# Patient Record
Sex: FEMALE | Race: WHITE | NOT HISPANIC OR LATINO | Employment: FULL TIME | URBAN - METROPOLITAN AREA
[De-identification: names, ages, dates, MRNs, and addresses within clinical notes are randomized per-mention and may not be internally consistent; named-entity substitution may affect disease eponyms.]

---

## 2017-02-21 ENCOUNTER — TELEPHONE (OUTPATIENT)
Dept: OBSTETRICS AND GYNECOLOGY | Facility: CLINIC | Age: 37
End: 2017-02-21

## 2017-02-21 NOTE — TELEPHONE ENCOUNTER
----- Message from Reji Newell sent at 2/21/2017  3:03 PM CST -----  Contact: Pt  X_ 1st Request  _ 2nd Request  _ 3rd Request    Who:BRANDON MARTINEZ [[id number]]    Why: Patient states she needs to speak with staff in regards to the Dr. Sending a request to ask for the patient medical records.    What Number to Call Back: [phone]    When to Expect a call back: (Before the end of the day)  -- if call after 3:00 call back will be tomorrow.

## 2017-02-22 NOTE — TELEPHONE ENCOUNTER
"Pt states was dx with Lichen Planus by derm/bx but has not has gyn bx. Has vulva itching and burning and no one can find out "why" Discussed vulva clinic schedule/location and appt scheudled  "

## 2017-05-02 ENCOUNTER — OFFICE VISIT (OUTPATIENT)
Dept: OBSTETRICS AND GYNECOLOGY | Facility: CLINIC | Age: 37
End: 2017-05-02
Attending: OBSTETRICS & GYNECOLOGY
Payer: COMMERCIAL

## 2017-05-02 VITALS
DIASTOLIC BLOOD PRESSURE: 70 MMHG | SYSTOLIC BLOOD PRESSURE: 112 MMHG | HEIGHT: 66 IN | WEIGHT: 232.56 LBS | BODY MASS INDEX: 37.38 KG/M2

## 2017-05-02 DIAGNOSIS — L43.9 LICHEN PLANUS: Primary | ICD-10-CM

## 2017-05-02 DIAGNOSIS — N76.1 CHRONIC VAGINITIS: ICD-10-CM

## 2017-05-02 DIAGNOSIS — B37.31 CANDIDIASIS OF VULVA AND VAGINA: ICD-10-CM

## 2017-05-02 PROBLEM — N76.0 VAGINITIS: Status: ACTIVE | Noted: 2017-05-02

## 2017-05-02 PROCEDURE — 1160F RVW MEDS BY RX/DR IN RCRD: CPT | Mod: S$GLB,,, | Performed by: OBSTETRICS & GYNECOLOGY

## 2017-05-02 PROCEDURE — 99204 OFFICE O/P NEW MOD 45 MIN: CPT | Mod: S$GLB,,, | Performed by: OBSTETRICS & GYNECOLOGY

## 2017-05-02 PROCEDURE — 99999 PR PBB SHADOW E&M-EST. PATIENT-LVL II: CPT | Mod: PBBFAC,,, | Performed by: OBSTETRICS & GYNECOLOGY

## 2017-05-02 PROCEDURE — 87210 SMEAR WET MOUNT SALINE/INK: CPT | Mod: QW,S$GLB,, | Performed by: OBSTETRICS & GYNECOLOGY

## 2017-05-02 PROCEDURE — 87102 FUNGUS ISOLATION CULTURE: CPT

## 2017-05-02 RX ORDER — CLOBETASOL PROPIONATE 0.5 MG/G
OINTMENT TOPICAL
COMMUNITY
Start: 2015-05-23

## 2017-05-02 RX ORDER — AMITRIPTYLINE HYDROCHLORIDE 25 MG/1
25 TABLET, FILM COATED ORAL
COMMUNITY
Start: 2015-07-01 | End: 2017-08-07

## 2017-05-02 RX ORDER — SERTRALINE HYDROCHLORIDE 50 MG/1
50 TABLET, FILM COATED ORAL
COMMUNITY
Start: 2015-07-01

## 2017-05-02 NOTE — MR AVS SNAPSHOT
"    [facility] [address]  [address] [facility] [facility] [address] [address]  [facility] [address]  Phone: [phone]  Fax: [phone]                  Sheri Bey   2017 2:30 PM   Office Visit    Description:  Female : 1980   Provider:  Edis Bearden III, MD   Department:  [facility] [address]           Reason for Visit     Vulvar Itch                To Do List           Goals (5 Years of Data)     None      [facility]     [facility] Nurse Care Line -  Assistance  Unless otherwise directed by your provider, please contact [facility], our nurse care line that is available for  assistance.     Registered nurses in the [facility] provide: appointment scheduling, clinical advisement, health education, and other advisory services.  Call: [phone] (toll free)               Medications                Verify that the below list of medications is an accurate representation of the medications you are currently taking.  If none reported, the list may be blank. If incorrect, please contact your healthcare provider. Carry this list with you in case of emergency.           Current Medications     amitriptyline (ELAVIL) 25 MG tablet 25 mg.    clobetasol 0.05% (TEMOVATE) 0.05 % Oint 2 (two) times daily.    sertraline (ZOLOFT) 50 MG tablet Take 50 mg by mouth.           Clinical Reference Information           Your Vitals Were     BP Height Weight Last Period BMI    112/70 5' 6" (1.676 m) 105.5 kg (232 lb 9.4 oz) 2017 37.54 kg/m2      Blood Pressure          Most Recent Value    BP  112/70      Allergies as of 2017     No Known Allergies      Immunizations Administered on Date of Encounter - 2017     None      [facility] Sign-Up     Activating your [facility] account is as easy as 1-2-3!     1) Visit [email or web address], select Sign Up Now, enter this activation code and your date of birth, then select Next.  [email or web address]  Expires: 2017  3:01 PM      2) Create a " username and password to use when you visit [facility] in the future and select a security question in case you lose your password and select Next.    3) Enter your e-mail address and click Sign Up!    Additional Information  If you have questions, please e-mail [email or web address] or call [phone] to talk to our [facility] staff. Remember, [employer] [facility] is NOT to be used for urgent needs. For medical emergencies, dial 911.         Language Assistance Services     ATTENTION: Language assistance services are available, free of charge. Please call [phone].      ATENCIÓN: Si habla español, tiene a wright disposición servicios gratuitos de asistencia lingüística. Llame al [phone].     CHÚ Ý: N?u b?n nói Ti?ng Vi?t, có các d?ch v? h? tr? ngôn ng? mi?n phí dành cho b?n. G?i s? [phone].         [facility] [address] complies with applicable Federal civil rights laws and does not discriminate on the basis of race, color, national origin, age, disability, or sex.

## 2017-05-02 NOTE — PROGRESS NOTES
Subjective:     Patient ID: Sheri Bey is a 36 y.o. female.     Chief Complaint: Vulvar Itch (new patient, self refer)     History of Present Illness: This patient is a 36 y.o.female, who presents to the GYN Vulva clinic for evaluation of vulvar irritation and discomfort associated with lichen planus.      Patient's last menstrual period was 04/11/2017.    Review of Systems    GENERAL: No fever, chills, fatigability or weightchange  SKIN: No rashes, itching or changes in color or texture of skin.  HEAD: No headaches or recent head trauma.  EYES: Visual acuity fine. No photophobia,r diplopia.  EARS: Denies earache or vertigo  NOSE: No loss of smell, no epistaxis or postnasal drip.  MOUTH & THROAT: No hoarseness or change in voice.   NODES: Denies swollen glands.  CHEST: Denies STACY, cyanosis, wheezing, cough and sputum production.  CARDIOVASCULAR: Denies chest pain, PND, orthopnea or reduced exercise tolerance.  ABDOMEN: Appetite fine. No weight loss. bloating, Denies diarrhea, abdominal pain, hematemesis or blood in stool.  URINARY: No flank pain, dysuria or hematuria.  PERIPHERAL VASCULAR: No claudication or cyanosis.Varicosities  MUSCULOSKELETAL: No joint stiffness or swelling. Denies back pain.muscle aches  NEUROLOGIC: No history of seizures, paralysis, alteration of gait or coordination.       Objective:       Physical Exam     APPEARANCE: Well nourished, well developed, in no acute distress.    GENITOURINARY:  Vulva: Abnormal female genital architecture with fusion of the frenulum and prepuce with partial burying of the clitoris under the scarred clitoral redd, erosive changes of the vestibule and vagina noted.  Urethral Meatus: Normal size and location, no lesions, no prolapse.  Urethra: No masses, tenderness, prolapse or scarring.  Vagina: Moist with decreased rugae, thick yellowish discharge, no significant cystocele or rectocele.  Cervix: No lesions, normal diameter, no stenosis, no cervical motion  tenderness. .  Uterus: 6 week size, regular shape, mobile, non-tender, normal position, good support.  Adnexa: No masses, tenderness or CDS nodularity.  Anus Perineum: No lesions, no relaxation, no external hemorrhoids.  Abdomen: No masses, tenderness, hernia or ascites, no hepatasplenomegaly  Skin: No rashes, lesions, ulcers, acne, hirsutism.  Peripheral/lower extremities: No edema, erythema or tenderness.  Lymphatic: No axillary, neck or groin nodes palp.  Mental Status: Alert, oriented x 3, normal affect and mood.  Oral: Josef striae noted on the buccal mucosa.          @PROCEDURE:@  Wet Prep:  pH = 6  -WBCS = to numerous to count  -Lactobacilli = decreased  -BV = Amsel negative  -Candida = Hyphae none seen  -Trichomnas = none seen  -Cells- Basal and Parabasal, Superficial: Maturation: Increase basal and parabasal cells with decreased superficial cells noted  -Impression: Squamous active inflammatory vaginitis  -Treatment: 100 mg/g hydrocortisone mixed with 2% Cleocin base  -[facility] in  4 weeks         Assessment:      1. Lichen planus               Plan:  1.  Clobetasol ointment to be applied to the vulva twice a day for 2 weeks,  once a day for  would uses a two week, then once a day Monday and Thursday.  2.  Compound a cream with 100 mg/g hydrocortisone and a 2% Cleocin base; 1 applicator to be applied daily for 1 week one half applicator daily for 1 week, then one fourth applicator Monday and Thursday until next visit.  3.  Candida culture of the vagina taken.  4.  Return to clinic               No orders of the defined types were placed in this encounter.

## 2017-06-02 LAB — FUNGUS SPEC CULT: NORMAL

## 2017-06-13 ENCOUNTER — TELEPHONE (OUTPATIENT)
Dept: OBSTETRICS AND GYNECOLOGY | Facility: CLINIC | Age: 37
End: 2017-06-13

## 2017-06-13 ENCOUNTER — LAB VISIT (OUTPATIENT)
Dept: LAB | Facility: OTHER | Age: 37
End: 2017-06-13
Attending: OBSTETRICS & GYNECOLOGY
Payer: COMMERCIAL

## 2017-06-13 ENCOUNTER — OFFICE VISIT (OUTPATIENT)
Dept: OBSTETRICS AND GYNECOLOGY | Facility: CLINIC | Age: 37
End: 2017-06-13
Attending: OBSTETRICS & GYNECOLOGY
Payer: COMMERCIAL

## 2017-06-13 VITALS
DIASTOLIC BLOOD PRESSURE: 76 MMHG | BODY MASS INDEX: 35.93 KG/M2 | SYSTOLIC BLOOD PRESSURE: 122 MMHG | HEIGHT: 66 IN | WEIGHT: 223.56 LBS

## 2017-06-13 DIAGNOSIS — N76.1 CHRONIC VAGINITIS: ICD-10-CM

## 2017-06-13 DIAGNOSIS — Z20.5 CONTACT WITH AND (SUSPECTED) EXPOSURE TO VIRAL HEPATITIS: ICD-10-CM

## 2017-06-13 DIAGNOSIS — B37.31 CANDIDIASIS OF VULVA AND VAGINA: ICD-10-CM

## 2017-06-13 DIAGNOSIS — L43.9 LICHEN PLANUS: Primary | ICD-10-CM

## 2017-06-13 LAB
ALBUMIN SERPL BCP-MCNC: 3.9 G/DL
ALP SERPL-CCNC: 73 U/L
ALT SERPL W/O P-5'-P-CCNC: 11 U/L
AST SERPL-CCNC: 14 U/L
BILIRUB DIRECT SERPL-MCNC: 0.2 MG/DL
BILIRUB SERPL-MCNC: 0.5 MG/DL
PROT SERPL-MCNC: 7.2 G/DL

## 2017-06-13 PROCEDURE — 87102 FUNGUS ISOLATION CULTURE: CPT

## 2017-06-13 PROCEDURE — 99999 PR PBB SHADOW E&M-EST. PATIENT-LVL III: CPT | Mod: PBBFAC,,, | Performed by: OBSTETRICS & GYNECOLOGY

## 2017-06-13 PROCEDURE — 80074 ACUTE HEPATITIS PANEL: CPT

## 2017-06-13 PROCEDURE — 36415 COLL VENOUS BLD VENIPUNCTURE: CPT

## 2017-06-13 PROCEDURE — 80076 HEPATIC FUNCTION PANEL: CPT

## 2017-06-13 PROCEDURE — 99214 OFFICE O/P EST MOD 30 MIN: CPT | Mod: S$GLB,,, | Performed by: OBSTETRICS & GYNECOLOGY

## 2017-06-13 NOTE — TELEPHONE ENCOUNTER
----- Message from Martha Loving sent at 6/13/2017 12:53 PM CDT -----  Contact: Self  The pt is calling in regards of getting an ointment if possible. The pt can be reached at   [phone]. Thanks KG

## 2017-06-13 NOTE — PROGRESS NOTES
Subjective:     Patient ID: Sheri Bey is a 36 y.o. female.     Chief Complaint: Vulvar Itch (follow up)     History of Present Illness: This patient is a 36 y.o. female, who presents to the GYN Vulva clinic for evaluation of erosive lichen planus.  The patient has used hydrocortisone 100 mg/g in a 2% Cleocin vaginal cream base.  This therapeutic regimen has improved her vulvovaginal discomfort.  She complains of a bloody vaginal discharge with insertion of the applicator.    Patient's last menstrual period was 06/06/2017.    Review of Systems    GENERAL: No fever, chills, fatigability or weightchange  SKIN: No rashes, itching or changes in color or texture of skin.  HEAD: No headaches or recent head trauma.  EYES: Visual acuity fine. No photophobia,r diplopia.  EARS: Denies earache or vertigo  NOSE: No loss of smell, no epistaxis or postnasal drip.  MOUTH & THROAT: No hoarseness or change in voice.   NODES: Denies swollen glands.  CHEST: Denies STACY, cyanosis, wheezing, cough and sputum production.  CARDIOVASCULAR: Denies chest pain, PND, orthopnea or reduced exercise tolerance.  ABDOMEN: Appetite fine. No weight loss. bloating, Denies diarrhea, abdominal pain, hematemesis or blood in stool.  URINARY: No flank pain, dysuria or hematuria.  PERIPHERAL VASCULAR: No claudication or cyanosis.Varicosities  MUSCULOSKELETAL: No joint stiffness or swelling. Denies back pain.muscle aches  NEUROLOGIC: No history of seizures, paralysis, alteration of gait or coordination.       Objective:       Physical Exam     APPEARANCE: Well nourished, well developed, in no acute distress.    GENITOURINARY:  Vulva: Abnormal female genital architecture with fusion of the frenulum and prepuce with partial burying of the clitoris under the scarred clitoral redd, erosive changes of the vestibule and vagina noted.  Urethral Meatus: Normal size and location, no lesions, no prolapse.  Urethra: No masses, tenderness, prolapse or scarring.  Vagina:  thin, atrophic and with decreased rugae, vaginal erosion and vestibule erosions noted.  no discharge, no significant cystocele or rectocele.  Cervix: No lesions, normal diameter, no stenosis, no cervical motion tenderness. .  Uterus: 6 week size, regular shape, mobile, non-tender, normal position, good support.  Adnexa: No masses, tenderness or CDS nodularity.  Anus Perineum: No lesions, no relaxation, no external hemorrhoids.  Abdomen: No masses, tenderness, hernia or ascites, no hepatasplenomegaly  Skin: No rashes, lesions, ulcers, acne, hirsutism.  Peripheral/lower extremities: No edema, erythema or tenderness.  Lymphatic: No axillary, neck or groin nodes palp.  Mental Status: Alert, oriented x 3, normal affect and mood.          @PROCEDURE:@  Wet Prep:  pH = 6.0  -WBCS = TNTC  -Lactobacilli = absent  -BV = Amsel negative  -Candida = Hyphae none seen  -Trichomnas = none seen  -Cells- Basal and Parabasal, Superficial: Maturation: Basal and parabasal cells or numerous with occasional superficial cells seen  -Impression: Desquamatous inflammatory vaginitis   -Treatment: Local estrogen, 2% Cleocin Vaginal Cream  -[facility] in   weeks         Assessment:      1. Lichen planus    2. Chronic vaginitis               Plan:  1.  Discussed the need for better control of the lichen planus by increasing the vaginal hydrocortisone therapy.  2.  Patient advised to increase her hydrocortisone/Cleocin regimen to: 4 g of hydrocortisone/2% Cleocin per vagina nightly for 1 week, 3 g nightly for 1 week, 2 g nightly for 1 week, 1 g nightly until next visit in 4 weeks.  3.  Candida culture of the vagina taken   4.  Discussed the potential association of hepatitis C and lichen planus, hepatitis C panel ordered today along with liver bracket study.  5.  Return to clinic in 4 weeks.            No orders of the defined types were placed in this encounter.

## 2017-06-13 NOTE — TELEPHONE ENCOUNTER
Pt clarifying that dr did not reorder clobetasol ointment, states she has this already. rx not ordered at this visit

## 2017-06-14 ENCOUNTER — TELEPHONE (OUTPATIENT)
Dept: OBSTETRICS AND GYNECOLOGY | Facility: CLINIC | Age: 37
End: 2017-06-14

## 2017-06-14 LAB
HAV IGM SERPL QL IA: NEGATIVE
HBV CORE IGM SERPL QL IA: NEGATIVE
HBV SURFACE AG SERPL QL IA: NEGATIVE
HCV AB SERPL QL IA: NEGATIVE

## 2017-06-14 NOTE — TELEPHONE ENCOUNTER
----- Message from Edis Bearden III, MD sent at 6/14/2017  1:17 PM CDT -----  Notify the patient that her Hepatitis screen was negative.

## 2017-07-11 ENCOUNTER — OFFICE VISIT (OUTPATIENT)
Dept: OBSTETRICS AND GYNECOLOGY | Facility: CLINIC | Age: 37
End: 2017-07-11
Attending: OBSTETRICS & GYNECOLOGY
Payer: COMMERCIAL

## 2017-07-11 VITALS
WEIGHT: 224.19 LBS | BODY MASS INDEX: 36.03 KG/M2 | HEIGHT: 66 IN | SYSTOLIC BLOOD PRESSURE: 110 MMHG | DIASTOLIC BLOOD PRESSURE: 70 MMHG

## 2017-07-11 DIAGNOSIS — N76.1 CHRONIC VAGINITIS: ICD-10-CM

## 2017-07-11 DIAGNOSIS — L43.9 LICHEN PLANUS: Primary | ICD-10-CM

## 2017-07-11 DIAGNOSIS — B37.31 CANDIDIASIS OF VULVA AND VAGINA: ICD-10-CM

## 2017-07-11 PROCEDURE — 99214 OFFICE O/P EST MOD 30 MIN: CPT | Mod: S$GLB,,, | Performed by: OBSTETRICS & GYNECOLOGY

## 2017-07-11 PROCEDURE — 99999 PR PBB SHADOW E&M-EST. PATIENT-LVL III: CPT | Mod: PBBFAC,,, | Performed by: OBSTETRICS & GYNECOLOGY

## 2017-07-11 PROCEDURE — 87102 FUNGUS ISOLATION CULTURE: CPT

## 2017-07-11 PROCEDURE — 87210 SMEAR WET MOUNT SALINE/INK: CPT | Mod: QW,S$GLB,, | Performed by: OBSTETRICS & GYNECOLOGY

## 2017-07-11 RX ORDER — PREDNISONE 20 MG/1
20 TABLET ORAL DAILY
Qty: 20 TABLET | Refills: 1 | Status: SHIPPED | OUTPATIENT
Start: 2017-07-11 | End: 2017-09-01 | Stop reason: SDUPTHER

## 2017-07-11 NOTE — PROGRESS NOTES
Subjective:     Patient ID: Sheri Bey is a 36 y.o. female.     Chief Complaint: Lichen Planus     History of Present Illness: This patient is a 36 y.o. female, who presents to the GYN Vulva clinic for evaluation of lichen planus and subacute the squamous active inflammatory vaginitis.  She has been using hydrocortisone Cleocin 4 g nightly for a week 3 g nightly for week 2 g nightly for week 1 g nightly until her visit today.  Her Candida culture was negative.     Patient's last menstrual period was 06/06/2017.    Review of Systems    GENERAL: No fever, chills, fatigability or weightchange  SKIN: No rashes, itching or changes in color or texture of skin.  HEAD: No headaches or recent head trauma.  EYES: Visual acuity fine. No photophobia,r diplopia.  EARS: Denies earache or vertigo  NOSE: No loss of smell, no epistaxis or postnasal drip.  MOUTH & THROAT: No hoarseness or change in voice.   NODES: Denies swollen glands.  CHEST: Denies STACY, cyanosis, wheezing, cough and sputum production.  CARDIOVASCULAR: Denies chest pain, PND, orthopnea or reduced exercise tolerance.  ABDOMEN: Appetite fine. No weight loss. bloating, Denies diarrhea, abdominal pain, hematemesis or blood in stool.  URINARY: No flank pain, dysuria or hematuria.  PERIPHERAL VASCULAR: No claudication or cyanosis.Varicosities  MUSCULOSKELETAL: No joint stiffness or swelling. Denies back pain.muscle aches  NEUROLOGIC: No history of seizures, paralysis, alteration of gait or coordination.       Objective:       Physical Exam     APPEARANCE: Well nourished, well developed, in no acute distress.    GENITOURINARY:  Vulva: Abnormal female genital architecture with fusion of the frenulum and prepuce with partial burying of the clitoris under the scarred clitoral redd, erosive changes of the vestibule and vagina noted.  Urethral Meatus: Normal size and location, no lesions, no prolapse.  Urethra: No masses, tenderness, prolapse or scarring.  Vagina: thin,  atrophic and with decreased rugae, no discharge, no significant cystocele or rectocele.  Cervix: No lesions, normal diameter, no stenosis, no cervical motion tenderness. .  Uterus: 6 week size, regular shape, mobile, non-tender, normal position, good support.  Adnexa: No masses, tenderness or CDS nodularity.  Anus Perineum: No lesions, no relaxation, no external hemorrhoids.  Abdomen: No masses, tenderness, hernia or ascites, no hepatasplenomegaly  Skin: No rashes, lesions, ulcers, acne, hirsutism.  Peripheral/lower extremities: No edema, erythema or tenderness.  Lymphatic: No axillary, neck or groin nodes palp.  Mental Status: Alert, oriented x 3, normal affect and mood.          @PROCEDURE:@  Wet Prep:  pH = 6.0  -WBCS = increased slightly  -Lactobacilli = none seen  -BV = Amsel negative  -Candida = Hyphae none seen  -Trichomnas = none seen  -Cells- Basal and Parabasal, Superficial: Maturation: Parabasal cells and superficial cells noted  -Impression: Improved to squamous active inflammatory vaginitis  -Treatment: No new therapy indicated  -[facility] in 9 days         Assessment:      1. Lichen planus    2. Chronic vaginitis               Plan:  1.  Candida culture taken of the vagina.  2.  DC vaginal hydrocortisone Cleocin.  3.  In view of the continued erosions of the vestibule and vagina will begin prednisone 20 mg daily.  4.  Bone density ordered as baseline associated with beginning systemic prednisone therapy.  5.  Return to clinic on Thursday 7/20/17                No orders of the defined types were placed in this encounter.

## 2017-07-14 LAB — FUNGUS SPEC CULT: NORMAL

## 2017-07-20 ENCOUNTER — HOSPITAL ENCOUNTER (OUTPATIENT)
Dept: RADIOLOGY | Facility: OTHER | Age: 37
Discharge: HOME | End: 2017-07-20
Attending: OBSTETRICS & GYNECOLOGY
Payer: COMMERCIAL

## 2017-07-20 ENCOUNTER — OFFICE VISIT (OUTPATIENT)
Dept: OBSTETRICS AND GYNECOLOGY | Facility: CLINIC | Age: 37
End: 2017-07-20
Attending: OBSTETRICS & GYNECOLOGY
Payer: COMMERCIAL

## 2017-07-20 VITALS
HEIGHT: 66 IN | BODY MASS INDEX: 35.79 KG/M2 | SYSTOLIC BLOOD PRESSURE: 122 MMHG | WEIGHT: 222.69 LBS | DIASTOLIC BLOOD PRESSURE: 74 MMHG

## 2017-07-20 DIAGNOSIS — B37.31 CANDIDIASIS OF VULVA AND VAGINA: ICD-10-CM

## 2017-07-20 DIAGNOSIS — L43.9 LICHEN PLANUS: ICD-10-CM

## 2017-07-20 DIAGNOSIS — N76.1 CHRONIC VAGINITIS: ICD-10-CM

## 2017-07-20 DIAGNOSIS — L43.9 LICHEN PLANUS: Primary | ICD-10-CM

## 2017-07-20 DIAGNOSIS — N90.89 IRRITATION OF VULVA: ICD-10-CM

## 2017-07-20 PROCEDURE — 77080 DXA BONE DENSITY AXIAL: CPT | Mod: TC

## 2017-07-20 PROCEDURE — 77080 DXA BONE DENSITY AXIAL: CPT | Mod: 26,,, | Performed by: RADIOLOGY

## 2017-07-20 PROCEDURE — 99214 OFFICE O/P EST MOD 30 MIN: CPT | Mod: S$GLB,,, | Performed by: OBSTETRICS & GYNECOLOGY

## 2017-07-20 PROCEDURE — 99999 PR PBB SHADOW E&M-EST. PATIENT-LVL II: CPT | Mod: PBBFAC,,, | Performed by: OBSTETRICS & GYNECOLOGY

## 2017-07-20 NOTE — PROGRESS NOTES
Subjective:     Patient ID: Sheri Bey is a 36 y.o. female.     Chief Complaint: Lichen Planus     History of Present Illness: This patient is a 36 y.o.female, who presents to the GYN Vulva clinic for evaluation of lichen planus.  The patient had not responded to local vaginal hydrocortisone and 2% Cleocin vaginal cream that had been compounded for her.  She was placed on prednisone 20 mg oral tablets and states that her discharge and irritation have improved.  She had a DEXA scan of her bones which indicated normal bone density.        Patient's last menstrual period was 07/06/2017.    Review of Systems    GENERAL: No fever, chills, fatigability or weightchange  SKIN: No rashes, itching or changes in color or texture of skin.  HEAD: No headaches or recent head trauma.  EYES: Visual acuity fine. No photophobia,r diplopia.  EARS: Denies earache or vertigo  NOSE: No loss of smell, no epistaxis or postnasal drip.  MOUTH & THROAT: No hoarseness or change in voice.   NODES: Denies swollen glands.  CHEST: Denies STACY, cyanosis, wheezing, cough and sputum production.  CARDIOVASCULAR: Denies chest pain, PND, orthopnea or reduced exercise tolerance.  ABDOMEN: Appetite fine. No weight loss. bloating, Denies diarrhea, abdominal pain, hematemesis or blood in stool.  URINARY: No flank pain, dysuria or hematuria.  PERIPHERAL VASCULAR: No claudication or cyanosis.Varicosities  MUSCULOSKELETAL: No joint stiffness or swelling. Denies back pain.muscle aches  NEUROLOGIC: No history of seizures, paralysis, alteration of gait or coordination.       Objective:       Physical Exam     APPEARANCE: Well nourished, well developed, in no acute distress.    GENITOURINARY:  Vulva: Abnormal female genital architecture with fusion of the frenulum and prepuce with partial burying of the clitoris under the scarred clitoral redd, erosive changes of the vestibule and vagina have improved but are not completely healed.  Urethral Meatus: Normal size and  location, no lesions, no prolapse.  Urethra: No masses, tenderness, prolapse or scarring.  Vagina: thin, atrophic and with decreased rugae, there is a thin yellowish discharge noted, there were areas of vaginal mucosa erosions noted, no significant cystocele or rectocele were noted  Cervix: No lesions, normal diameter, no stenosis, no cervical motion tenderness. .  Uterus: 6 week size, regular shape, mobile, non-tender, normal position, good support.  Adnexa: No masses, tenderness or CDS nodularity.  Anus Perineum: No lesions, no relaxation, no external hemorrhoids.  Abdomen: No masses, tenderness, hernia or ascites, no hepatasplenomegaly  Skin: No rashes, lesions, ulcers, acne, hirsutism.  Peripheral/lower extremities: No edema, erythema or tenderness.  Lymphatic: No axillary, neck or groin nodes palp.  Mental Status: Alert, oriented x 3, normal affect and mood.          @PROCEDURE:@       Assessment:      1. Lichen planus    2. Chronic vaginitis    3. Irritation of vulva               Plan:  1.  Continue prednisone 20 mg strength tablets daily.  2.  Re-start the use of the compounded Cleocin/Hydrocortisone vaginal therapy 1 applicator a day for week, then one half applicator a day until next visit.  3.  Return to clinic on August 7.          No orders of the defined types were placed in this encounter.

## 2017-08-07 ENCOUNTER — OFFICE VISIT (OUTPATIENT)
Dept: OBSTETRICS AND GYNECOLOGY | Facility: CLINIC | Age: 37
End: 2017-08-07
Payer: COMMERCIAL

## 2017-08-07 DIAGNOSIS — N76.1 CHRONIC VAGINITIS: ICD-10-CM

## 2017-08-07 DIAGNOSIS — L43.9 LICHEN PLANUS: Primary | ICD-10-CM

## 2017-08-07 PROCEDURE — 3008F BODY MASS INDEX DOCD: CPT | Mod: S$GLB,,, | Performed by: OBSTETRICS & GYNECOLOGY

## 2017-08-07 PROCEDURE — 87210 SMEAR WET MOUNT SALINE/INK: CPT | Mod: QW,S$GLB,, | Performed by: OBSTETRICS & GYNECOLOGY

## 2017-08-07 PROCEDURE — 99214 OFFICE O/P EST MOD 30 MIN: CPT | Mod: S$GLB,,, | Performed by: OBSTETRICS & GYNECOLOGY

## 2017-08-07 PROCEDURE — 99999 PR PBB SHADOW E&M-EST. PATIENT-LVL I: CPT | Mod: PBBFAC,,, | Performed by: OBSTETRICS & GYNECOLOGY

## 2017-08-07 RX ORDER — AMITRIPTYLINE HYDROCHLORIDE 25 MG/1
25 TABLET, FILM COATED ORAL
COMMUNITY
Start: 2015-07-01

## 2017-08-07 RX ORDER — PREDNISONE 20 MG/1
TABLET ORAL
Refills: 0 | COMMUNITY
Start: 2017-08-03

## 2017-08-07 NOTE — PROGRESS NOTES
Subjective:     Patient ID: Sheri Bey is a 36 y.o. female.     Chief Complaint: Lichen Planus        History of Present Illness: This patient is a 36 y.o.female, who presents to the GYN Vulva clinic for evaluation of lichen planus.  The patient had a minimal response to local vaginal hydrocortisone and 2% Cleocin vaginal cream that had been compounded for her.  She was placed on prednisone 20 mg oral tablets and states that her discharge and irritation have improved but she has not continue to feel better.  S      Patient's last menstrual period was 07/06/2017.    Review of Systems    GENERAL: No fever, chills, fatigability or weightchange  SKIN: No rashes, itching or changes in color or texture of skin.  HEAD: No headaches or recent head trauma.  EYES: Visual acuity fine. No photophobia,r diplopia.  EARS: Denies earache or vertigo  NOSE: No loss of smell, no epistaxis or postnasal drip.  MOUTH & THROAT: No hoarseness or change in voice.   NODES: Denies swollen glands.  CHEST: Denies STACY, cyanosis, wheezing, cough and sputum production.  CARDIOVASCULAR: Denies chest pain, PND, orthopnea or reduced exercise tolerance.  ABDOMEN: Appetite fine. No weight loss. bloating, Denies diarrhea, abdominal pain, hematemesis or blood in stool.  URINARY: No flank pain, dysuria or hematuria.  PERIPHERAL VASCULAR: No claudication or cyanosis.Varicosities  MUSCULOSKELETAL: No joint stiffness or swelling. Denies back pain.muscle aches  NEUROLOGIC: No history of seizures, paralysis, alteration of gait or coordination.       Objective:       Physical Exam     APPEARANCE: Well nourished, well developed, in no acute distress.    GENITOURINARY:  Vulva: Abnormal female genital architecture with fusion of the frenulum and prepuce with partial burying of the clitoris under the scarred clitoral redd, erosive changes of the vestibule and vagina have improved but are not completely healed.  Urethral Meatus: Normal size and location, no lesions,  no prolapse.  Urethra: No masses, tenderness, prolapse or scarring.  Vagina: Moist with decreased rugae, there is a thin yellowish discharge noted, there were erosive areas of vaginal mucosa noted, no significant cystocele or rectocele were noted.  Cervix: No lesions, normal diameter, no stenosis, no cervical motion tenderness. .  Uterus: 6 week size, regular shape, mobile, non-tender, normal position, good support.  Adnexa: No masses, tenderness or CDS nodularity.  Anus Perineum: No lesions, no relaxation, no external hemorrhoids.  Abdomen: No masses, tenderness, hernia or ascites, no hepatasplenomegaly  Skin: No rashes, lesions, ulcers, acne, hirsutism.  Peripheral/lower extremities: No edema, erythema or tenderness.  Lymphatic: No axillary, neck or groin nodes palp.  Mental Status: Alert, oriented x 3, normal affect and mood.          @PROCEDURE:@  Wet Prep:  pH = 6.0  -WBCS = present not as inflamed  -Lactobacilli = decreased  -BV = Amsel negative  -Candida = Hyphae none noted  -Trichomnas = none noted  -Cells- Basal and Parabasal, Superficial: Maturation: mixture of basal, parabasal and occasional superficial cells noted  -Impression:decreased vaginal inflamation  -Treatment: increase vaginal therapy to 1 appl QD and continue oral prednisone 20 mg qd.  -[facility] in 3 weeks.         Assessment:      1. Lichen planus    2. Chronic vaginitis               Plan:  1.  Vaginal hydrocortisone and 2% Cleocin cream 1 appl QD and prednisone 20 mg QD.  2.  RTC 3 weeks            No orders of the defined types were placed in this encounter.

## 2017-08-14 LAB — FUNGUS SPEC CULT: NORMAL

## 2017-08-30 ENCOUNTER — OFFICE VISIT (OUTPATIENT)
Dept: OBSTETRICS AND GYNECOLOGY | Facility: CLINIC | Age: 37
End: 2017-08-30
Payer: COMMERCIAL

## 2017-08-30 VITALS
SYSTOLIC BLOOD PRESSURE: 122 MMHG | DIASTOLIC BLOOD PRESSURE: 78 MMHG | HEIGHT: 66 IN | WEIGHT: 219.81 LBS | BODY MASS INDEX: 35.32 KG/M2

## 2017-08-30 DIAGNOSIS — L43.9 LICHEN PLANUS: Primary | ICD-10-CM

## 2017-08-30 DIAGNOSIS — N90.89 IRRITATION OF VULVA: ICD-10-CM

## 2017-08-30 PROCEDURE — 87102 FUNGUS ISOLATION CULTURE: CPT

## 2017-08-30 PROCEDURE — 3008F BODY MASS INDEX DOCD: CPT | Mod: S$GLB,,, | Performed by: OBSTETRICS & GYNECOLOGY

## 2017-08-30 PROCEDURE — 99999 PR PBB SHADOW E&M-EST. PATIENT-LVL II: CPT | Mod: PBBFAC,,, | Performed by: OBSTETRICS & GYNECOLOGY

## 2017-08-30 PROCEDURE — 99214 OFFICE O/P EST MOD 30 MIN: CPT | Mod: S$GLB,,, | Performed by: OBSTETRICS & GYNECOLOGY

## 2017-08-30 NOTE — PROGRESS NOTES
Subjective:     Patient ID: Sheri Bey is a 36 y.o. female.     Chief Complaint: Lichen Planus     History of Present Illness: This patient is a 36 y.o. female, who [facility] for evaluation of lichen planus.  She was prescribed Prednisone 20 mg and vaginal hydrocortisone in a clindamycin base but was unable to get the hydrocortisone because of it's expense. She has not used the vaginal cream for 10 days and has experienced vulvovaginal irritation.    Patient's last menstrual period was 08/08/2017.    Review of Systems    GENERAL: No fever, chills, fatigability or weightchange  SKIN: No rashes, itching or changes in color or texture of skin.  HEAD: No headaches or recent head trauma.  EYES: Visual acuity fine. No photophobia,r diplopia.  EARS: Denies earache or vertigo  NOSE: No loss of smell, no epistaxis or postnasal drip.  MOUTH & THROAT: No hoarseness or change in voice.   NODES: Denies swollen glands.  CHEST: Denies STACY, cyanosis, wheezing, cough and sputum production.  CARDIOVASCULAR: Denies chest pain, PND, orthopnea or reduced exercise tolerance.  ABDOMEN: Appetite fine. No weight loss. bloating, Denies diarrhea, abdominal pain, hematemesis or blood in stool.  URINARY: No flank pain, dysuria or hematuria.  PERIPHERAL VASCULAR: No claudication or cyanosis.Varicosities  MUSCULOSKELETAL: No joint stiffness or swelling. Denies back pain.muscle aches  NEUROLOGIC: No history of seizures, paralysis, alteration of gait or coordination.       Objective:       Physical Exam     APPEARANCE: Well nourished, well developed, in no acute distress.    GENITOURINARY:  Vulva: Abnormal female genital architecture with fusion of the frenulum and prepuce with partial burying of the clitoris under the scarred clitoral redd, erosive changes of the vestibule and vagina are noted today.  Urethral Meatus: Normal size and location, no lesions, no prolapse.  Urethra: No masses, tenderness, prolapse or  scarring.  Vagina: Moist with decreased rugae, there is a thin, white discharge noted, there were erosive areas of vaginal mucosa noted, no significant cystocele or rectocele were noted.Cervix: No lesions, normal diameter, no stenosis, no cervical motion tenderness. .  Uterus: 6 week size, regular shape, mobile, non-tender, normal position, good support.  Adnexa: No masses, tenderness or CDS nodularity.  Anus Perineum: No lesions, no relaxation, no external hemorrhoids.  Abdomen: No masses, tenderness, hernia or ascites, no hepatasplenomegaly  Skin: No rashes, lesions, ulcers, acne, hirsutism.  Peripheral/lower extremities: No edema, erythema or tenderness.  Lymphatic: No axillary, neck or groin nodes palp.  Mental Status: Alert, oriented x 3, normal affect and mood.          @PROCEDURE:@  Wet Prep:  pH = 6.0       Assessment:      1. Lichen planus    2. Irritation of vulva               Plan:  1.  Discussed need for combined systemic vaginal therapy.  2.  RTC 6 weeks            No orders of the defined types were placed in this encounter.

## 2017-09-01 DIAGNOSIS — N76.1 CHRONIC VAGINITIS: ICD-10-CM

## 2017-09-01 DIAGNOSIS — L43.9 LICHEN PLANUS: ICD-10-CM

## 2017-09-01 DIAGNOSIS — B37.31 CANDIDIASIS OF VULVA AND VAGINA: ICD-10-CM

## 2017-09-05 RX ORDER — PREDNISONE 20 MG/1
TABLET ORAL
Qty: 20 TABLET | Refills: 0 | Status: SHIPPED | OUTPATIENT
Start: 2017-09-05 | End: 2017-09-07 | Stop reason: SDUPTHER

## 2017-09-07 DIAGNOSIS — L43.9 LICHEN PLANUS: ICD-10-CM

## 2017-09-07 DIAGNOSIS — B37.31 CANDIDIASIS OF VULVA AND VAGINA: ICD-10-CM

## 2017-09-07 DIAGNOSIS — N76.1 CHRONIC VAGINITIS: ICD-10-CM

## 2017-09-07 RX ORDER — PREDNISONE 20 MG/1
20 TABLET ORAL DAILY
Qty: 20 TABLET | Refills: 1 | Status: SHIPPED | OUTPATIENT
Start: 2017-09-07

## 2017-09-12 ENCOUNTER — TELEPHONE (OUTPATIENT)
Dept: OBSTETRICS AND GYNECOLOGY | Facility: CLINIC | Age: 37
End: 2017-09-12

## 2017-09-14 ENCOUNTER — TELEPHONE (OUTPATIENT)
Dept: OBSTETRICS AND GYNECOLOGY | Facility: CLINIC | Age: 37
End: 2017-09-14

## 2017-10-02 LAB — FUNGUS SPEC CULT: NORMAL
